# Patient Record
Sex: FEMALE | Race: WHITE | Employment: OTHER | URBAN - METROPOLITAN AREA
[De-identification: names, ages, dates, MRNs, and addresses within clinical notes are randomized per-mention and may not be internally consistent; named-entity substitution may affect disease eponyms.]

---

## 2019-09-16 ENCOUNTER — APPOINTMENT (OUTPATIENT)
Dept: PHYSICAL THERAPY | Age: 79
End: 2019-09-16

## 2019-09-20 ENCOUNTER — APPOINTMENT (OUTPATIENT)
Dept: PHYSICAL THERAPY | Age: 79
End: 2019-09-20

## 2019-11-22 ENCOUNTER — HOSPITAL ENCOUNTER (OUTPATIENT)
Dept: PHYSICAL THERAPY | Age: 79
Discharge: HOME | End: 2019-11-22
Payer: MEDICARE

## 2019-11-22 PROCEDURE — 97110 THERAPEUTIC EXERCISES: CPT

## 2019-11-22 PROCEDURE — 97162 PT EVAL MOD COMPLEX 30 MIN: CPT

## 2019-11-22 NOTE — PROGRESS NOTES
PHYSICAL THERAPY - DAILY TREATMENT NOTE    Patient Name: Bennie Caputo        Date: 2019  : 1940   YES Patient  Verified  Visit #:     Insurance: Payor: Sang Hanley / Plan: [place] MEDICARE PART A & B / Product Type: Medicare /      In time: 1010 Out time: 1100   Total Treatment Time: 50     BCBS/Medicare Time Tracking (below)   Total Timed Codes (min):  50 1:1 Treatment Time:  50     TREATMENT AREA =  Right hip pain [M25.551]    SUBJECTIVE  Pain Level (on 0 to 10 scale):  8  / 10   Medication Changes/New allergies or changes in medical history, any new surgeries or procedures? NO    If yes, update Summary List   Subjective Functional Status/Changes:  []  No changes reported   The patient reports onset of R hip pain that has progressively worsened over the last 6 months. Imaging reveals R hip OA and R lumbar facet arthropathy. She notes minimal LBP but reports intermittent tingling/burning down R leg to the outer lower leg, worse when lying in bed. R hip pain is constant and she takes Tylenol every 4 hours to manage pain. R hip pain worsens when walking and she sometimes must lift R leg with hands for car transfers.          Modalities Rationale:     decrease inflammation, decrease pain and increase tissue extensibility to improve patient's ability to perform ADLs   min [] Estim, type/location:                                      []  att     []  unatt     []  w/US     []  w/ice    []  w/heat    min []  Mechanical Traction: type/lbs                   []  pro   []  sup   []  int   []  cont    []  before manual    []  after manual    min []  Ultrasound, settings/location:      min []  Iontophoresis w/ dexamethasone, location:                                               []  take home patch       []  in clinic    min []  Ice     []  Heat    location/position:     min []  Vasopneumatic Device, press/temp:     min []  Other:    [x] Skin assessment post-treatment (if applicable):    [x] intact    [x]  redness- no adverse reaction     []redness - adverse reaction:        10 min Therapeutic Exercise:  [x]  See flow sheet   Rationale:      increase ROM and increase strength to improve the patients ability to perform unlimited ADLs       Billed With/As:   [] TE   [] TA   [] Neuro   [] Self Care Patient Education: [x] Review HEP    [] Progressed/Changed HEP based on:   [] positioning   [] body mechanics   [] transfers   [] heat/ice application    [x] other: Issued written HEP and reviewed     Other Objective/Functional Measures:    AROM: L/S flex 75%, ext 25%, B SB 25%  PROM: dec R hip IR/ER, hypomobile L/S PIVM  MMT: poor TA recruitment in H/L, R hip grossly 3+/5, hip abd 3-/5  MLT: dec B HS length       Post Treatment Pain Level (on 0 to 10) scale:   8  / 10     ASSESSMENT  Assessment/Changes in Function:     See POC     []  See Progress Note/Recertification   Patient will continue to benefit from skilled PT services to modify and progress therapeutic interventions, address functional mobility deficits, address ROM deficits, address strength deficits, analyze and address soft tissue restrictions, analyze and cue movement patterns, analyze and modify body mechanics/ergonomics, assess and modify postural abnormalities, address imbalance/dizziness and instruct in home and community integration to attain remaining goals. Progress toward goals / Updated goals:    Progressing towards newly established goals in [address] [facility] [address]  [x]  Upgrade activities as tolerated YES Continue plan of care   []  Discharge due to :    []  Other:      Therapist: Dawn Kumar, PT, DPT, MTC, CMTPT    Date: 11/22/2019 Time: 10:09 AM     No future appointments.

## 2019-11-22 NOTE — PROGRESS NOTES
[address]  PHYSICAL THERAPY AT [address] [place] [address]  [address] ,[address], [address]  Phone: [phone]  Fax: [phone] / [address] [address] PHYSICAL THERAPY SERVICES  Patient Name: Deepti Melara : 1940   Medical   Diagnosis: Right hip pain [M25.551] Treatment Diagnosis: R hip pain  LBP   Onset Date: 6 months ago     Referral Source: Karlene Allen MD Start of Care [facility]): 2019   Prior Hospitalization: See medical history Provider #: [id number]   Prior Level of Function: Able to perform car transfers without using hands to lift leg,    Comorbidities: Arthritis, osteoporosis    Medications: Verified on Patient Summary List   The Plan of Care and following information is based on the information from the initial evaluation.   ===========================================================================================  Assessment / key information: Patient is a 78 y.o. female who presents to [facility] with diagnosis of R hip and LBP. The patient reports onset of R hip pain that has progressively worsened over the last 6 months. Imaging reveals R hip OA and R lumbar facet arthropathy. She notes minimal LBP but reports intermittent tingling/burning down R leg to the outer lower leg, worse when lying in bed. R hip pain is constant and she takes Tylenol every 4 hours to manage pain. R hip pain worsens when walking and she sometimes must lift R leg with hands for car transfers. Objective PT examination findings include:  AROM: L/S flex 75%, ext 25%, B SB 25%  PROM: dec R hip IR/ER, hypomobile L/S PIVM  MMT: poor TA recruitment in H/L, R hip grossly 3+/5, hip abd 3-/5  MLT: dec B HS length    Special Test: + R Slump/SLR for LBP and neural tension    A home exercise program was demonstrated and provided to address the above objective and functional deficits.  Patient can benefit from skilled PT interventions to improve ROM, decrease pain, to facilitate performance of ADLs & improve overall functional status.   ===========================================================================================  Eval Complexity: History MEDIUM  Complexity : 1-2 comorbidities / personal factors will impact the outcome/ POC ;  Examination  MEDIUM Complexity : 3 Standardized tests and measures addressing body structure, function, activity limitation and / or participation in recreation ; Presentation MEDIUM Complexity : Evolving with changing characteristics ; Decision Making MEDIUM Complexity : FOTO score of 26-74; Overall Complexity MEDIUM  Problem List: pain affecting function, decrease ROM, decrease strength, decrease ADL/ functional abilitiies, decrease activity tolerance and decrease flexibility/ joint mobility, FOTO score 46  Treatment Plan may include any combination of the following: Therapeutic exercise, Therapeutic activities, Neuromuscular re-education, Physical agent/modality, Gait/balance training, Manual therapy including dry needling, Aquatic therapy and Patient education  Patient / Family readiness to learn indicated by: asking questions, trying to perform skills and interest  Persons(s) to be included in education: patient (P)  Barriers to Learning/Limitations: no  Measures taken:    Patient Goal (s): \"maybe strengthen muscle\"   Patient self reported health status: good  Rehabilitation Potential: good   Short Term Goals: To be accomplished in  1-2  weeks:  1) Patient to be independent and compliant with initial HEP to prevent further disability. 2) Patient to perform 2x10 bridges indicating increased hip ext/core strength needed for bed mobility. 3) Patient will report decreased c/o pain to < or = 2/10 to facilitate ease with bed mobility with manageable sx in R hip.  Long Term Goals:  To be accomplished in  3-4  weeks:  1) Patient to be independent & compliant with a progressive, high level HEP in order to maintain gains made in physical therapy. 2) Improve FOTO score to 57 indicating significant functional improvement in order to return to PLOF and transfers. 3) Restore R hip strength in order to perform car transfers without assistance from UE. Frequency / Duration:   Patient to be seen  2-3  times per week for 3-4  weeks:  Patient / Caregiver education and instruction: self care, activity modification and exercises    Therapist Signature: Roderick Bowman PT, DPT, MTC, CMTPT Date: 71/75/0811   Certification Period: 11/22/19 to 2/20/20 Time: 2:52 PM   ===========================================================================================  I certify that the above Physical Therapy Services are being furnished while the patient is under my care. I agree with the treatment plan and certify that this therapy is necessary. Physician Signature:        Date:       Time:     Please sign and return to [facility] or you may fax the signed copy to [phone]. Thank you.

## 2019-11-25 ENCOUNTER — HOSPITAL ENCOUNTER (OUTPATIENT)
Dept: PHYSICAL THERAPY | Age: 79
Discharge: HOME | End: 2019-11-25
Payer: MEDICARE

## 2019-11-25 PROCEDURE — 97110 THERAPEUTIC EXERCISES: CPT

## 2019-11-26 NOTE — PROGRESS NOTES
PHYSICAL THERAPY - DAILY TREATMENT NOTE    Patient Name: Ximena Bell        Date: 2019  : 1940   YES Patient  Verified  Visit #:     Insurance: Payor: Jon Arnold / Plan: [place] MEDICARE PART A & B / Product Type: Medicare /      In time: 235 Out time: 335   Total Treatment Time: 55     BCBS/Medicare Time Tracking (below)   Total Timed Codes (min):  54 1:1 Treatment Time:  40     TREATMENT AREA =  Right hip pain [M25.551]    SUBJECTIVE  Pain Level (on 0 to 10 scale):  8  / 10   Medication Changes/New allergies or changes in medical history, any new surgeries or procedures?     NO    If yes, update Summary List   Subjective Functional Status/Changes:  []  No changes reported   The stretches help to loosen my back up        Modalities Rationale:     decrease inflammation, decrease pain and increase tissue extensibility to improve patient's ability to perform ADLs   min [] Estim, type/location:                                      []  att     []  unatt     []  w/US     []  w/ice    []  w/heat    min []  Mechanical Traction: type/lbs                   []  pro   []  sup   []  int   []  cont    []  before manual    []  after manual    min []  Ultrasound, settings/location:      min []  Iontophoresis w/ dexamethasone, location:                                               []  take home patch       []  in clinic    min []  Ice     []  Heat    location/position:     min []  Vasopneumatic Device, press/temp:     min []  Other:    [x] Skin assessment post-treatment (if applicable):    [x]  intact    [x]  redness- no adverse reaction     []redness - adverse reaction:        55/40 min Therapeutic Exercise:  [x]  See flow sheet   Rationale:      increase ROM and increase strength to improve the patients ability to perform unlimited ADLs       Billed With/As:   [] TE   [] TA   [] Neuro   [] Self Care Patient Education: [x] Review HEP    [] Progressed/Changed HEP based on:   [] positioning   [] body mechanics   [] transfers   [] heat/ice application    [x] other: Issued written HEP and reviewed     Other Objective/Functional Measures:    TE per FS   Post Treatment Pain Level (on 0 to 10) scale:   7  / 10     ASSESSMENT  Assessment/Changes in Function:     Poor TA recruitment. Difficulty isolating and unable to perform without valsalva     []  See Progress Note/Recertification   Patient will continue to benefit from skilled PT services to modify and progress therapeutic interventions, address functional mobility deficits, address ROM deficits, address strength deficits, analyze and address soft tissue restrictions, analyze and cue movement patterns, analyze and modify body mechanics/ergonomics, assess and modify postural abnormalities, address imbalance/dizziness and instruct in home and community integration to attain remaining goals.    Progress toward goals / Updated goals:    Progressing towards STG1     PLAN  [x]  Upgrade activities as tolerated YES Continue plan of care   []  Discharge due to :    []  Other:      Therapist: Georgie Choudhury, PT, DPT, MTC, CMTPT    Date: 11/25/2019 Time: 10:09 AM     Future Appointments   Date Time Provider Rukhsana Gutiérrez   12/9/2019 10:30 AM [facility]   12/11/2019 10:00 AM [facility]   12/16/2019 11:30 AM [facility]   12/18/2019 11:00 AM [facility]   12/23/2019  3:00 PM [facility]   12/27/2019 11:30 AM [facility]

## 2019-12-09 ENCOUNTER — HOSPITAL ENCOUNTER (OUTPATIENT)
Dept: PHYSICAL THERAPY | Age: 79
Discharge: HOME | End: 2019-12-09
Payer: MEDICARE

## 2019-12-09 PROCEDURE — 97110 THERAPEUTIC EXERCISES: CPT

## 2019-12-09 NOTE — PROGRESS NOTES
PHYSICAL THERAPY - DAILY TREATMENT NOTE    Patient Name: Mitzi Sloan        Date: 2019  : 1940   YES Patient  Verified  Visit #:   3   of   12  Insurance: Payor: Sharee Barton / Plan: [place] MEDICARE PART A & B / Product Type: Medicare /      In time: 1050 Out time: 105   Total Treatment Time: 50     BCBS/Medicare Time Tracking (below)   Total Timed Codes (min):  50 1:1 Treatment Time:  30     TREATMENT AREA =  Right hip pain [M25.551]    SUBJECTIVE  Pain Level (on 0 to 10 scale):  3.5-10  / 10   Medication Changes/New allergies or changes in medical history, any new surgeries or procedures?     NO    If yes, update Summary List   Subjective Functional Status/Changes:  []  No changes reported     Zuly been very busy and havent been able to do as many of the exercises but they help when I do them      Modalities Rationale:     decrease inflammation, decrease pain and increase tissue extensibility to improve patient's ability to perform ADLs   min [] Estim, type/location:                                      []  att     []  unatt     []  w/US     []  w/ice    []  w/heat    min []  Mechanical Traction: type/lbs                   []  pro   []  sup   []  int   []  cont    []  before manual    []  after manual    min []  Ultrasound, settings/location:      min []  Iontophoresis w/ dexamethasone, location:                                               []  take home patch       []  in clinic    min []  Ice     []  Heat    location/position:     min []  Vasopneumatic Device, press/temp:     min []  Other:    [x] Skin assessment post-treatment (if applicable):    [x]  intact    [x]  redness- no adverse reaction     []redness  adverse reaction:        50/30 min Therapeutic Exercise:  [x]  See flow sheet   Rationale:      increase ROM and increase strength to improve the patients ability to perform unlimited ADLs       Billed With/As:   [] TE   [] TA   [] Neuro   [] Self Care Patient Education: [x] Review HEP    [] Progressed/Changed HEP based on:   [] positioning   [] body mechanics   [] transfers   [] heat/ice application    [x] other:      Other Objective/Functional Measures:    TE per FS     Post Treatment Pain Level (on 0 to 10) scale:   5  / 10     ASSESSMENT  Assessment/Changes in Function:     Improved TA recruitment in H/L. Dec cueing required to cont breathing during all core stabilization exercises     []  See Progress Note/Recertification   Patient will continue to benefit from skilled PT services to modify and progress therapeutic interventions, address functional mobility deficits, address ROM deficits, address strength deficits, analyze and address soft tissue restrictions, analyze and cue movement patterns, analyze and modify body mechanics/ergonomics, assess and modify postural abnormalities, address imbalance/dizziness and instruct in home and community integration to attain remaining goals.    Progress toward goals / Updated goals:    Progressing towards STG1, 50% compliance     PLAN  [x]  Upgrade activities as tolerated YES Continue plan of care   []  Discharge due to :    []  Other:      Therapist: Abraham Choi, PT, DPT, MTC, CMTPT    Date: 12/9/2019 Time: 10:09 AM     Future Appointments   Date Time Provider Rukhsana Gutiérrez   12/11/2019 10:00 AM [place] [facility]   12/16/2019 11:30 AM [place] [facility]   12/18/2019 11:00 AM [place] [facility]   12/23/2019  3:00 PM [place] [facility]   12/27/2019 11:30 AM [place] [facility]

## 2019-12-11 ENCOUNTER — HOSPITAL ENCOUNTER (OUTPATIENT)
Dept: PHYSICAL THERAPY | Age: 79
Discharge: HOME | End: 2019-12-11
Payer: MEDICARE

## 2019-12-11 PROCEDURE — 97110 THERAPEUTIC EXERCISES: CPT

## 2019-12-16 ENCOUNTER — HOSPITAL ENCOUNTER (OUTPATIENT)
Dept: PHYSICAL THERAPY | Age: 79
Discharge: HOME | End: 2019-12-16
Payer: MEDICARE

## 2019-12-16 PROCEDURE — 97110 THERAPEUTIC EXERCISES: CPT

## 2019-12-16 NOTE — PROGRESS NOTES
PHYSICAL THERAPY - DAILY TREATMENT NOTE    Patient Name: Abdiaziz Monreal        Date: 2019  : 1940   YES Patient  Verified  Visit #:     Insurance: Payor: Wilfrido Nelson / Plan: [place] MEDICARE PART A & B / Product Type: Medicare /      In time: 8440 Out time: 308   Total Treatment Time: 50     BCBS/Medicare Time Tracking (below)   Total Timed Codes (min):  50 1:1 Treatment Time:  30     TREATMENT AREA =  Right hip pain [M25.551]    SUBJECTIVE  Pain Level (on 0 to 10 scale):  9  / 10   Medication Changes/New allergies or changes in medical history, any new surgeries or procedures? NO    If yes, update Summary List   Subjective Functional Status/Changes:  []  No changes reported     I reached down with my R arm to  a pen I dropped last week and felt immediate pain down my R arm and in my back. Its still sore.       Modalities Rationale:     decrease inflammation, decrease pain and increase tissue extensibility to improve patient's ability to perform ADLs   min [] Estim, type/location:                                      []  att     []  unatt     []  w/US     []  w/ice    []  w/heat    min []  Mechanical Traction: type/lbs                   []  pro   []  sup   []  int   []  cont    []  before manual    []  after manual    min []  Ultrasound, settings/location:      min []  Iontophoresis w/ dexamethasone, location:                                               []  take home patch       []  in clinic    min []  Ice     []  Heat    location/position:     min []  Vasopneumatic Device, press/temp:     min []  Other:    [x] Skin assessment post-treatment (if applicable):    [x]  intact    [x]  redness- no adverse reaction     []redness  adverse reaction:        50/30 min Therapeutic Exercise:  [x]  See flow sheet   Rationale:      increase ROM and increase strength to improve the patients ability to perform unlimited ADLs       Billed With/As:   [] TE   [] TA   [] Neuro   [] Self Care Patient Education: [x] Review HEP    [] Progressed/Changed HEP based on:   [] positioning   [] body mechanics   [] transfers   [] heat/ice application    [] other:      Other Objective/Functional Measures:    TE per FS  Held PPT>bridge, c/o pain during S/L clam   Post Treatment Pain Level (on 0 to 10) scale:   5-6  / 10     ASSESSMENT  Assessment/Changes in Function:     Fair tolerance to TE involving glutes but able to tolerate all others without inc in pain      []  See Progress Note/Recertification   Patient will continue to benefit from skilled PT services to modify and progress therapeutic interventions, address functional mobility deficits, address ROM deficits, address strength deficits, analyze and address soft tissue restrictions, analyze and cue movement patterns, analyze and modify body mechanics/ergonomics, assess and modify postural abnormalities, address imbalance/dizziness and instruct in home and community integration to attain remaining goals.    Progress toward goals / Updated goals:    Min progress towards STG2 today due to inc in pain     PLAN  [x]  Upgrade activities as tolerated YES Continue plan of care   []  Discharge due to :    []  Other:      Therapist: Kamron Gipson PT, DPT, MTC, CMTPT    Date: 12/16/2019 Time: 10:09 AM     Future Appointments   Date Time Provider Rukhsana Gutiérrez   12/18/2019 11:00 AM Althea Roque PT [place]   12/27/2019 11:30 AM Althea Roque PT [place]

## 2019-12-18 ENCOUNTER — APPOINTMENT (OUTPATIENT)
Dept: PHYSICAL THERAPY | Age: 79
End: 2019-12-18
Payer: MEDICARE

## 2019-12-23 ENCOUNTER — APPOINTMENT (OUTPATIENT)
Dept: PHYSICAL THERAPY | Age: 79
End: 2019-12-23
Payer: MEDICARE

## 2019-12-27 ENCOUNTER — APPOINTMENT (OUTPATIENT)
Dept: PHYSICAL THERAPY | Age: 79
End: 2019-12-27
Payer: MEDICARE

## 2020-12-01 NOTE — PROGRESS NOTES
[address] [facility] PHYSICAL THERAPY AT [address] [place] [address]  [address], [address]  Phone: [phone]  Fax: [phone] FOR PHYSICAL THERAPY          Patient Name: Yesenia York : 1940   Treatment/Medical Diagnosis: Right hip pain [M25.551]   Onset Date: 6 mo prior to eval    Referral Source: Kristi Olivas MD [facility]): 19   Prior Hospitalization: See Medical History Provider #: [id number]   Prior Level of Function: Able to perform car transfers without using hands to lift leg   Comorbidities: Arthritis, osteoporosis    Medications: Verified on Patient Summary List   Visits from [facility]: 5 Missed Visits: 1     Key Functional Changes/Progress: Patient is a 78 y.o. female who presents to [facility] with diagnosis of R hip and LBP. The patient did not return to PT after 19, canceling all future appointments due to extended travel and being [address] [place] [address]. Therefore a formal reassessment was unable to be performed and current status is unknown. At her last attended visit she reported 9/10 pain and had fair tolerance to therex due to c/o inc LBP after bending over to  dropped object from floor. Assessments/Recommendations: Discontinue therapy due to lack of attendance or compliance. If you have any questions/comments please contact us directly at [phone]. Thank you for allowing us to assist in the care of your patient.     Therapist Signature: Horacio Booker, PT, DPT, MTC, CMTPT Date: 2020   Reporting Period: 19 to 19 Time: 3:13 PM

## 2022-08-09 ENCOUNTER — HOSPITAL ENCOUNTER (OUTPATIENT)
Dept: PHYSICAL THERAPY | Age: 82
Discharge: HOME | End: 2022-08-09
Payer: MEDICARE

## 2022-08-09 PROCEDURE — 97535 SELF CARE MNGMENT TRAINING: CPT

## 2022-08-09 PROCEDURE — 97112 NEUROMUSCULAR REEDUCATION: CPT

## 2022-08-09 PROCEDURE — 97162 PT EVAL MOD COMPLEX 30 MIN: CPT

## 2022-08-09 NOTE — THERAPY EVALUATION
PHYSICAL THERAPY - DAILY TREATMENT NOTE    Patient Name: Nova Adrian        Date: 2022  : 1940   YES Patient  Verified  Visit #:     Insurance: Payor: Sheela Falconors / Plan: [place] MEDICARE PART A & B / Product Type: Medicare /      In time: 130 Out time: 225   Total Treatment Time: 50     BCBS/Medicare Time Tracking (below)   Total Timed Codes (min):  25 1:1 Treatment Time:  25     TREATMENT AREA =  Neck pain [M54.2]  Right shoulder pain [M25.511]    SUBJECTIVE  Pain Level (on 0 to 10 scale):  8  / 10   Medication Changes/New allergies or changes in medical history, any new surgeries or procedures? NO    If yes, update Summary List   Subjective Functional Status/Changes:  []  No changes reported   The patient reports onset of L hand numbness/tingling beginning approx 3 months ago and cervical pain. She notes L UE radicular SX inc when sitting in slouched position and unsupported chair. She is able to abolish SX if correcting posture or moving head into R SB position. She is LHD.        Modalities Rationale:     decrease inflammation, decrease pain and increase tissue extensibility to improve patient's ability to perform ADLs   min [] Estim, type/location:                                     []  att     []  unatt     []  w/US     []  w/ice    []  w/heat    min []  Mechanical Traction: type/lbs                   []  pro   []  sup   []  int   []  cont    []  before manual    []  after manual    min []  Ultrasound, settings/location:      min []  Iontophoresis w/ dexamethasone, location:                                               []  take home patch       []  in clinic    min []  Ice     []  Heat    location/position:     min []  Vasopneumatic Device, press/temp: If using vaso (only need to measure limb vaso being performed on)      pre-treatment girth :       post-treatment girth :       measured at (landmark location) :      min []  Other:    [x] Skin assessment post-treatment (if applicable):    [x]  intact    [x]  redness- no adverse reaction     []redness - adverse reaction:         min Therapeutic Exercise:  [x]  See flow sheet   Rationale:      increase ROM and increase strength to improve the patients ability to perform unlimited ADLs      min Manual Therapy: Technique:      [] S/DTM []IASTM []PROM  [] Passive Stretching  []manual TPR  []Jt manipulation:Gr I [] II []  III [] IV[] V[]  Treatment/Area:     Rationale:      decrease pain, increase ROM, increase tissue extensibility and decrease trigger points to improve patient's ability to perform ADLs  The manual therapy interventions were performed at a separate and distinct time from the therapeutic activities interventions.      min Therapeutic Activity: [x]  See flow sheet   Rationale:    increase ROM, increase strength, and improve coordination to improve the patients mechanics with reaching, transfers, reading    10 min Neuromuscular Re-ed: [x]  See flow sheet   Rationale:    improve coordination, improve balance, and increase proprioception to improve the patients postural awareness, stability and motor control    15 min Self Care: Postural review including use of lumbar roll and importance of supported sitting on couch/recliner/dining chair with neutral lumbar spine which improves cervicothoracic alignment, sitting ergonomic modifications at computer, sleeping position and use of cervical roll   Rationale:    increase ROM, increase strength and postural mechanics to improve the patients ability to perform home care     Billed With/As:   [] TE   [] TA   [x] Neuro   [x] Self Care Patient Education: [x] Review HEP    [] Progressed/Changed HEP based on:   [] positioning   [] body mechanics   [] transfers   [] heat/ice application    [x] other: Issued written HEP and reviewed     Other Objective/Functional Measures:    Postural Assessment: FHRS posture, holds head in slight R SB position, fair postural awareness, lumbar flexion collapse in unsupported sitting  AROM: C/S flex 50%, ext 25% with ERP, SBL 25% with inc L hand radicular SX, SBR 40% dec L radicular SX, dec scap retraction ROM and performed with abn mm firing and movement pattern   PROM: hypomobile T/S ext and L>R rotation, hypomobile OA fwd nod  MMT: dec strength scap retractors L>R and deep cervical neck flexors  MLT: dec length L>R pectoral girdle    Palpation: TP throughout L>R UT, SCM, lev scap, rhomboids  Special Tests: + L ULTT     Post Treatment Pain Level (on 0 to 10) scale:   7  / 10     ASSESSMENT  Assessment/Changes in Function:     See POC     []  See Progress Note/Recertification   Patient will continue to benefit from skilled PT services to modify and progress therapeutic interventions, address functional mobility deficits, address ROM deficits, address strength deficits, analyze and address soft tissue restrictions, analyze and cue movement patterns, analyze and modify body mechanics/ergonomics, assess and modify postural abnormalities, address imbalance/dizziness and instruct in home and community integration to attain remaining goals. Progress toward goals / Updated goals:    Progressing towards newly established goals in [address] [facility] [address]  [x]  Upgrade activities as tolerated YES Continue plan of care   []  Discharge due to :    []  Other:      Therapist: Neel Enriquez PT, DPT, MTC, CMTPT    Date: 8/9/2022 Time: 1:39 PM     No future appointments.

## 2022-08-09 NOTE — THERAPY EVALUATION
[address] [place] [address] PHYSICAL THERAPY AT [address] [place] [address] Eva Plass [address], [address] [place] [phone], [address]  Phone: [phone]  Fax: [phone] / [address] [address] PHYSICAL THERAPY SERVICES  Patient Name: [place] : 1940   Medical   Diagnosis: Neck Pain [M54.2] Treatment Diagnosis: C/S pain with UE radiculopathy   Onset Date: May 2022     Referral Source: Sohan Mccoy MD Start of Care [facility]): 2022   Prior Hospitalization: See medical history Provider #: [id number]   Prior Level of Function: Unlimited sitting tolerance, able to perform ADLs without UE radiculopathy   Comorbidities: Arthritis, HX lumbar stenosis   Medications: Verified on Patient Summary List   The Plan of Care and following information is based on the information from the initial evaluation.   ===========================================================================================  Assessment / key information: Patient is a 78 y.o. female who presents to [facility] with diagnosis of C/S pain. The patient reports onset of L hand numbness/tingling and cervical pain beginning approx 3 months ago. She notes L UE radicular SX inc when sitting in slouched position and unsupportive chairs. She is able to abolish SX if correcting posture or moving head into R SB position. She is LHD.     Objective PT examination findings include:  Postural Assessment: moderate FHRS posture, holds head in slight R SB position, fair postural awareness, lumbar flexion collapse in unsupported sitting  AROM: C/S flex 50%, ext 25% with ERP, SBL/RotL 25% with inc L hand radicular SX, SBR 40% dec L radicular SX, RotR 40% with inc L UE SX, dec scap retraction ROM and performed with abn mm firing and movement pattern   PROM: hypomobile T/S ext and L>R rotation, hypomobile OA fwd nod  MMT: dec strength scap retractors L>R and deep cervical neck flexors  MLT: dec length L>R pectoral girdle    Palpation: TP throughout L>R UT, SCM, lev scap, rhomboids  Special Tests: + L ULTT    A home exercise program was demonstrated and provided to address the above objective and functional deficits. Patient can benefit from skilled PT interventions to improve ROM, decrease radicular SX, to facilitate performance of ADLs & improve overall functional status.   ===========================================================================================  Eval Complexity: History MEDIUM  Complexity : 1-2 comorbidities / personal factors will impact the outcome/ POC ;  Examination  MEDIUM Complexity : 3 Standardized tests and measures addressing body structure, function, activity limitation and / or participation in recreation ; Presentation MEDIUM Complexity : Evolving with changing characteristics ; Decision Making MEDIUM Complexity : FOTO score of 26-74; Overall Complexity MEDIUM  Problem List: pain affecting function, decrease ROM, decrease strength, decrease ADL/ functional abilitiies, decrease activity tolerance, and decrease flexibility/ joint mobility, FOTO score 54  Treatment Plan may include any combination of the following: Therapeutic exercise, Therapeutic activities, Neuromuscular re-education, Physical agent/modality, Gait/balance training, Manual therapy including dry needling, Aquatic therapy and Patient education  Patient / Family readiness to learn indicated by: asking questions, trying to perform skills and interest  Persons(s) to be included in education: patient (P)  Barriers to Learning/Limitations: no  Measures taken:    Patient Goal (s): \"Something to keep my left hand from going to sleep. Neck causes this. \"   Patient self reported health status: good  Rehabilitation Potential: good  Short Term Goals: To be accomplished in  1-2  weeks:  1) Patient to be independent and compliant with initial HEP to prevent further disability.     2) Restore C/S AROM Rot to 50% without onset of radicular SX so ROM is available for driving. 3) Patient will report decreased c/o pain to < or = 3/10 to facilitate ability to sit for 15 min with manageable SX in L UE using postural correction techniques. Long Term Goals: To be accomplished in  3-4  weeks:  1) Patient to be independent & compliant with a progressive, high level HEP in order to maintain gains made in physical therapy. 2) Improve FOTO score to 61 indicating significant functional improvement in order to return to OF. 3) Patient to report 50% dec in L hand radicular SX to allow for inc ease performing ADLs and household chores. 4) Restore C/S AROM to [facility] without reproduction of L UE radicular SX to allow for inc ease with L UE OH reaching and prolonged sitting. Frequency / Duration:   Patient to be seen  2-3  times per week for 3-4  weeks:  Patient / Caregiver education and instruction: self care, activity modification and exercises    Therapist Signature: Antwan Shaffer, PT, DPT, MTC, CMTPT Date: 4/9/1170   Certification Period: 8/9/22 to 11/7/22 Time: 3:30 PM   ===========================================================================================  I certify that the above Physical Therapy Services are being furnished while the patient is under my care. I agree with the treatment plan and certify that this therapy is necessary. Physician Signature:        Date:       Time:              Valeria Hidalgo MD  Please sign and return to [facility] or you may fax the signed copy to [phone]. Thank you.

## 2022-08-15 ENCOUNTER — HOSPITAL ENCOUNTER (OUTPATIENT)
Dept: PHYSICAL THERAPY | Age: 82
Discharge: HOME | End: 2022-08-15
Payer: MEDICARE

## 2022-08-15 PROCEDURE — 97110 THERAPEUTIC EXERCISES: CPT

## 2022-08-15 PROCEDURE — 97112 NEUROMUSCULAR REEDUCATION: CPT

## 2022-08-15 PROCEDURE — 97530 THERAPEUTIC ACTIVITIES: CPT

## 2022-08-15 NOTE — THERAPY EVALUATION
PHYSICAL THERAPY - DAILY TREATMENT NOTE    Patient Name: Irene Bardales        Date: 8/15/2022  : 1940   YES Patient  Verified  Visit #:     Insurance: Payor: Gavin Lira / Plan: [place] MEDICARE PART A & B / Product Type: Medicare /      In time:  Out time: 1200   Total Treatment Time: 45     BCBS/Medicare Time Tracking (below)   Total Timed Codes (min):  45 1:1 Treatment Time:  45     TREATMENT AREA =  Neck pain [M54.2]    SUBJECTIVE  Pain Level (on 0 to 10 scale):  8  / 10   Medication Changes/New allergies or changes in medical history, any new surgeries or procedures? NO    If yes, update Summary List   Subjective Functional Status/Changes:  []  No changes reported     The hand pain and numbness is getting worse and takes longer for the pain to go away when I change my position. I have to hold my head in the perfect position.  Its worse when I look down while writing (pt is LHD)       Modalities Rationale:     decrease inflammation, decrease pain and increase tissue extensibility to improve patient's ability to perform ADLs   min [] Estim, type/location:                                     []  att     []  unatt     []  w/US     []  w/ice    []  w/heat    min []  Mechanical Traction: type/lbs                   []  pro   []  sup   []  int   []  cont    []  before manual    []  after manual    min []  Ultrasound, settings/location:      min []  Iontophoresis w/ dexamethasone, location:                                               []  take home patch       []  in clinic    min []  Ice     []  Heat    location/position:     min []  Vasopneumatic Device, press/temp: If using vaso (only need to measure limb vaso being performed on)      pre-treatment girth :       post-treatment girth :       measured at (landmark location) :      min []  Other:    [x] Skin assessment post-treatment (if applicable):    [x]  intact    [x]  redness- no adverse reaction     []redness - adverse reaction: 15 min Therapeutic Exercise:  [x]  See flow sheet   Rationale:      increase ROM and increase strength to improve the patients ability to perform unlimited ADLs      min Therapeutic Activity: [x]  See flow sheet   Rationale:    increase ROM, increase strength, and improve coordination to improve the patients mechanics with reaching, transfers    15 min Neuromuscular Re-ed: [x]  See flow sheet, postural review, spinal proprioception when sitting, writing, reaching   Rationale:    improve coordination, improve balance, and increase proprioception to improve the patients postural awareness, stability and motor control    15 min Self Care: Use of postural brace, take motrin/OTC NSAIDS as recommended on bottle and per MD direction, recommendations to improve posture when writing checks and standing to wash dishes   Rationale:    increase ROM, increase strength and postural mechanics to improve the patients ability to perform home care     Billed With/As:   [] TE   [] TA   [x] Neuro   [x] Self Care Patient Education: [x] Review HEP    [] Progressed/Changed HEP based on:   [] positioning   [] body mechanics   [] transfers   [] heat/ice application    [x] other:      Other Objective/Functional Measures:    See FS     Post Treatment Pain Level (on 0 to 10) scale:   7  / 10     ASSESSMENT  Assessment/Changes in Function:     Improved initial sitting posture, assume FHRS position when beginning to fatigue, conversing, reaching all resulting in immediate onset of L hand radicular SX, able to abolish when correcting posture and maintaining neutral c/s     []  See Progress Note/Recertification   Patient will continue to benefit from skilled PT services to modify and progress therapeutic interventions, address functional mobility deficits, address ROM deficits, address strength deficits, analyze and address soft tissue restrictions, analyze and cue movement patterns, analyze and modify body mechanics/ergonomics, assess and modify postural abnormalities, address imbalance/dizziness and instruct in home and community integration to attain remaining goals.    Progress toward goals / Updated goals:    Progressing towards STG1     PLAN  [x]  Upgrade activities as tolerated YES Continue plan of care   []  Discharge due to :    []  Other:      Therapist: Julien Retana PT, DPT, MTC, CMTPT    Date: 8/15/2022 Time: 1:39 PM     Future Appointments   Date Time Provider Rukhsana Gutiérrez   8/17/2022 12:00 PM Amor Lares PT [facility]   8/26/2022 11:00 AM Latha Bangura, PT [facility]   8/29/2022 11:15 AM Amor Lares PT [facility]   8/31/2022 12:00 PM Amor Lares PT [facility]   9/6/2022 11:00 AM Latha Bangura PT [facility]   9/8/2022 11:00 AM FABIAN Marquez [facility]

## 2022-08-17 ENCOUNTER — HOSPITAL ENCOUNTER (OUTPATIENT)
Dept: PHYSICAL THERAPY | Age: 82
Discharge: HOME | End: 2022-08-17
Payer: MEDICARE

## 2022-08-17 PROCEDURE — 97112 NEUROMUSCULAR REEDUCATION: CPT

## 2022-08-17 PROCEDURE — 97110 THERAPEUTIC EXERCISES: CPT

## 2022-08-17 PROCEDURE — 97535 SELF CARE MNGMENT TRAINING: CPT

## 2022-08-17 NOTE — THERAPY EVALUATION
PHYSICAL THERAPY - DAILY TREATMENT NOTE    Patient Name: Rom Langford        Date: 2022  : 1940   YES Patient  Verified  Visit #:   3   of   12  Insurance: Payor: Inna Haines / Plan: [place] MEDICARE PART A & B / Product Type: Medicare /      In time: 9773 Out time: 1250   Total Treatment Time: 45     BCBS/Medicare Time Tracking (below)   Total Timed Codes (min):  45 1:1 Treatment Time:  40     TREATMENT AREA =  Neck pain [M54.2]    SUBJECTIVE  Pain Level (on 0 to 10 scale):  8  / 10   Medication Changes/New allergies or changes in medical history, any new surgeries or procedures?     NO    If yes, update Summary List   Subjective Functional Status/Changes:  []  No changes reported     Sitting up straighter and not rounding my shoulders to look down when writing checks is helping       Modalities Rationale:     decrease inflammation, decrease pain and increase tissue extensibility to improve patient's ability to perform ADLs   min [] Estim, type/location:                                     []  att     []  unatt     []  w/US     []  w/ice    []  w/heat    min []  Mechanical Traction: type/lbs                   []  pro   []  sup   []  int   []  cont    []  before manual    []  after manual    min []  Ultrasound, settings/location:      min []  Iontophoresis w/ dexamethasone, location:                                               []  take home patch       []  in clinic    min []  Ice     []  Heat    location/position:     min []  Vasopneumatic Device, press/temp: If using vaso (only need to measure limb vaso being performed on)      pre-treatment girth :       post-treatment girth :       measured at (landmark location) :      min []  Other:    [x] Skin assessment post-treatment (if applicable):    [x]  intact    [x]  redness- no adverse reaction     []redness - adverse reaction:        15/10 min Therapeutic Exercise:  [x]  See flow sheet   Rationale:      increase ROM and increase strength to improve the patients ability to perform unlimited ADLs      min Therapeutic Activity: [x]  See flow sheet   Rationale:    increase ROM, increase strength, and improve coordination to improve the patients mechanics with reaching, transfers    15 min Neuromuscular Re-ed: [x]  See flow sheet, postural review, spinal proprioception when sitting, writing, reaching   Rationale:    improve coordination, improve balance, and increase proprioception to improve the patients postural awareness, stability and motor control    15 min Self Care: Don/doff postural brace with lumbar strap   Rationale:    increase ROM, increase strength and postural mechanics to improve the patients ability to perform home care     Billed With/As:   [] TE   [] TA   [x] Neuro   [x] Self Care Patient Education: [x] Review HEP    [] Progressed/Changed HEP based on:   [] positioning   [] body mechanics   [] transfers   [] heat/ice application    [x] other:      Other Objective/Functional Measures:    See FS, added seated cervical and scapular retraction     Post Treatment Pain Level (on 0 to 10) scale:   7  / 10     ASSESSMENT  Assessment/Changes in Function:     Able to tolerate sitting for inc length of time wearing new brace without onset of L UE radicular SX. Improving postural awareness and able to correct thoracic flexion/rounded shoulder collapse with fewer cues. []  See Progress Note/Recertification   Patient will continue to benefit from skilled PT services to modify and progress therapeutic interventions, address functional mobility deficits, address ROM deficits, address strength deficits, analyze and address soft tissue restrictions, analyze and cue movement patterns, analyze and modify body mechanics/ergonomics, assess and modify postural abnormalities, address imbalance/dizziness and instruct in home and community integration to attain remaining goals.    Progress toward goals / Updated goals:    Met STG1, progressing towards STG2 and 3     PLAN  [x]  Upgrade activities as tolerated YES Continue plan of care   []  Discharge due to :    []  Other:      Therapist: Miri Funez, PT, DPT, MTC, CMTPT    Date: 8/17/2022 Time: 1:39 PM     Future Appointments   Date Time Provider Rukhsana Gutiérrez   8/26/2022 11:00 AM Thang Mills PT [facility]   8/29/2022 11:15 AM Ruby Chavez PT [facility]   8/31/2022 12:00 PM Ruby Chavez PT [facility]   9/6/2022 11:00 AM Thang Mills PT [facility]   9/8/2022 11:00 AM Thang Mills PT [facility]

## 2022-08-26 ENCOUNTER — HOSPITAL ENCOUNTER (OUTPATIENT)
Dept: PHYSICAL THERAPY | Age: 82
Discharge: HOME | End: 2022-08-26
Payer: MEDICARE

## 2022-08-26 PROCEDURE — 97140 MANUAL THERAPY 1/> REGIONS: CPT

## 2022-08-26 PROCEDURE — 97112 NEUROMUSCULAR REEDUCATION: CPT

## 2022-08-26 PROCEDURE — 97110 THERAPEUTIC EXERCISES: CPT

## 2022-08-26 NOTE — PROGRESS NOTES
PHYSICAL THERAPY - DAILY TREATMENT NOTE    Patient Name: Temi         Date: 2022  : 1940   YES Patient  Verified  Visit #:      of   12  Insurance: Payor: Nisreen Garza / Plan: [place] MED[place] & B / Product Type: Medicare /      In time: 1100 Out time:    Total Treatment Time: 45     BCBS/Medicare Time Tracking (below)   Total Timed Codes (min):  45 1:1 Treatment Time:  45     TREATMENT AREA =  Neck pain [M54.2]    SUBJECTIVE  Pain Level (on 0 to 10 scale):  8  / 10   Medication Changes/New allergies or changes in medical history, any new surgeries or procedures? NO    If yes, update Summary List   Subjective Functional Status/Changes:  []  No changes reported     My tingles are stronger when I slouch forward. The brace is helping with my posture but I feel a lot of stiffness and pressure in my shoulders. Modalities Rationale:     decrease edema, decrease inflammation, and decrease pain to improve patient's ability to perform pain-free ADLs.     min [] Estim, type/location:                                      []  att     []  unatt     []  w/US     []  w/ice    []  w/heat    min []  Mechanical Traction: type/lbs                   []  pro   []  sup   []  int   []  cont    []  before manual    []  after manual    min []  Ultrasound, settings/location:      min []  Iontophoresis w/ dexamethasone, location:                                               []  take home patch       []  in clinic    min []  Ice     []  Heat    location/position:     min []  Vasopneumatic Device, press/temp:    If using vaso (only need to measure limb vaso being performed on)      pre-treatment girth :       post-treatment girth :       measured at (landmark location) :      min []  Other:    [] Skin assessment post-treatment (if applicable):    []  intact    []  redness- no adverse reaction                  []redness - adverse reaction:        15 min Therapeutic Exercise:  [x]  See flow sheet Rationale:      increase ROM, increase strength, and improve coordination to improve the patients ability to perform unlimited ADLs. 10 min Manual Therapy: Gentle STM to B UT and mid scapular region in sitting   Rationale:      decrease pain, increase ROM, increase tissue extensibility, and decrease edema  to improve patient's ability to improve postural control  The manual therapy interventions were performed at a separate and distinct time from the therapeutic activities interventions. 15 min Neuromuscular Re-ed: [x]  See flow sheet   Rationale:    increase ROM, increase strength, improve coordination, and increase proprioception to improve the patients ability to improve postural control    Billed With/As:   [] TE   [] TA   [] Neuro   [] Self Care Patient Education: [x] Review HEP    [] Progressed/Changed HEP based on:   [] positioning   [] body mechanics   [] transfers   [] heat/ice application    [] other:      Other Objective/Functional Measures: Added tband B ER and Tband Rows     Post Treatment Pain Level (on 0 to 10) scale:   6  / 10     ASSESSMENT  Assessment/Changes in Function:     PT reported improvement in stiffness in neck following session. She required frequent cues to correct for postural collapse in seated position. Good scapular retraction noted with manual cues although pt was unable to maintain when cues were withdrawn.      []  See Progress Note/Recertification   Patient will continue to benefit from skilled PT services to modify and progress therapeutic interventions, address functional mobility deficits, address ROM deficits, address strength deficits, analyze and address soft tissue restrictions, analyze and cue movement patterns, and analyze and modify body mechanics/ergonomics to attain remaining goals. Progress toward goals / Updated goals:    Progressing towards STG 3.       PLAN  [x]  Upgrade activities as tolerated YES Continue plan of care   []  Discharge due to : []  Other:      Therapist: Sidra Bradshaw DPT    Date: 8/26/2022 Time: 1:16 PM     Future Appointments   Date Time Provider Rukhsana Gutiérrez   8/29/2022 11:15 AM Tacho He, PT [facility]   8/31/2022 12:00 PM Tacho He, PT [facility]   9/6/2022 11:00 AM Haily Ortiz PT [facility]   9/8/2022 11:00 AM Haily Ortiz, PT [facility]

## 2022-08-29 ENCOUNTER — APPOINTMENT (OUTPATIENT)
Dept: PHYSICAL THERAPY | Age: 82
End: 2022-08-29
Payer: MEDICARE

## 2022-08-29 ENCOUNTER — TELEPHONE (OUTPATIENT)
Dept: PHYSICAL THERAPY | Age: 82
End: 2022-08-29

## 2022-08-31 ENCOUNTER — HOSPITAL ENCOUNTER (OUTPATIENT)
Dept: PHYSICAL THERAPY | Age: 82
Discharge: HOME | End: 2022-08-31
Payer: MEDICARE

## 2022-08-31 PROCEDURE — 97112 NEUROMUSCULAR REEDUCATION: CPT

## 2022-08-31 PROCEDURE — 97110 THERAPEUTIC EXERCISES: CPT

## 2022-08-31 NOTE — THERAPY EVALUATION
PHYSICAL THERAPY - DAILY TREATMENT NOTE    Patient Name: Shahla Kurtz        Date: 2022  : 1940   YES Patient  Verified  Visit #:      of   12  Insurance: Payor: Alhaji Peters / Plan: [place] MEDICARE PART A & B / Product Type: Medicare /      In time:  Out time: 1250   Total Treatment Time: 45     BCBS/Medicare Time Tracking (below)   Total Timed Codes (min):  40 1:1 Treatment Time:  40     TREATMENT AREA =  Neck pain [M54.2]    SUBJECTIVE  Pain Level (on 0 to 10 scale):  8  / 10   Medication Changes/New allergies or changes in medical history, any new surgeries or procedures? NO    If yes, update Summary List   Subjective Functional Status/Changes:  []  No changes reported     Its worse. My hand is numb more often than it was and it doesn't do away as quickly if I move my head. Wearing the posture brace helps but I haven't worn it much.       Modalities Rationale:     decrease inflammation, decrease pain and increase tissue extensibility to improve patient's ability to perform ADLs   min [] Estim, type/location:                                     []  att     []  unatt     []  w/US     []  w/ice    []  w/heat    min []  Mechanical Traction: type/lbs                   []  pro   []  sup   []  int   []  cont    []  before manual    []  after manual    min []  Ultrasound, settings/location:      min []  Iontophoresis w/ dexamethasone, location:                                               []  take home patch       []  in clinic    min []  Ice     []  Heat    location/position:     min []  Vasopneumatic Device, press/temp: If using vaso (only need to measure limb vaso being performed on)      pre-treatment girth :       post-treatment girth :       measured at (landmark location) :      min []  Other:    [x] Skin assessment post-treatment (if applicable):    [x]  intact    [x]  redness- no adverse reaction     []redness - adverse reaction:        10 min Therapeutic Exercise:  [x]  See flow sheet   Rationale:      increase ROM and increase strength to improve the patients ability to perform unlimited ADLs      min Therapeutic Activity: [x]  See flow sheet   Rationale:    increase ROM, increase strength, and improve coordination to improve the patients mechanics with reaching, transfers    30 min Neuromuscular Re-ed: [x]  See flow sheet, postural review, spinal proprioception when sitting, writing, reaching   Rationale:    improve coordination, improve balance, and increase proprioception to improve the patients postural awareness, stability and motor control      Billed With/As:   [x] TE   [] TA   [x] Neuro   [] Self Care Patient Education: [x] Review HEP    [] Progressed/Changed HEP based on:   [] positioning   [] body mechanics   [] transfers   [] heat/ice application    [x] other: inc frequency/duration of wearing posture brace. Reviewed importance of taking NSAIDS as directed on bottle. 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  Demonstrates improved postural awareness in static sitting but unaware of frequent postural collapse when reaching, turning and looking down.      []  See Progress Note/Recertification   Patient will continue to benefit from skilled PT services to modify and progress therapeutic interventions, address functional mobility deficits, address ROM deficits, address strength deficits, analyze and address soft tissue restrictions, analyze and cue movement patterns, analyze and modify body mechanics/ergonomics, assess and modify postural abnormalities, address imbalance/dizziness and instruct in home and community integration to attain remaining goals.   Progress toward goals / Updated goals:    Min progress towards STG3     PLAN  [x]  Upgrade activities as tolerated YES Continue plan of care   []  Discharge due to :    []  Other:      Therapist: Paradise Petit, PT, DPT, MTC, CMTPT    Date: 8/31/2022 Time: 1:39 PM     Future Appointments   Date Time Provider Rukhsana Gutiérrez   9/6/2022 11:00 AM Halle eRd PT [facility]   9/8/2022 11:00 AM Halle Red PT [facility]

## 2022-09-06 ENCOUNTER — HOSPITAL ENCOUNTER (OUTPATIENT)
Dept: PHYSICAL THERAPY | Age: 82
Discharge: HOME | End: 2022-09-06
Payer: MEDICARE

## 2022-09-06 PROCEDURE — 97112 NEUROMUSCULAR REEDUCATION: CPT

## 2022-09-06 PROCEDURE — 97110 THERAPEUTIC EXERCISES: CPT

## 2022-09-06 NOTE — PROGRESS NOTES
[address] [place] [address] PHYSICAL THERAPY AT [address] [place] [address] [facility], [address] [place] [address], [address]  Phone: [phone]  Fax: [phone]  PROGRESS NOTE  Patient Name: Huber Barton : 1940   Treatment/Medical Diagnosis: Neck pain [M54.2]   Referral Source: Shoaib Kang MD     Date of Initial Visit: 2022 Attended Visits: 6 Missed Visits: 0     SUMMARY OF TREATMENT  Postural education, nm re-education of scapular stabilizers, stretching and AROM of C/s and T/s, HEP prescription, education for donning/doffing lumbar brace  CURRENT STATUS  Sleeping with cervical roll. Lumbar brace when working or sitting for long periods, helps with a lot of symptoms and improves postural control. Wears 50% of the time. Goal/Measure of Progress Goal Met? 1. Patient to be independent & compliant with a progressive, high level HEP in order to maintain gains made in physical therapy. Status at last Eval: - Current Status: Independent, compliant daily  yes   2. Improve FOTO score to 61 indicating significant functional improvement in order to return to PLOF. Status at last Eval: 54 Current Status: 43 Progressing   3. Patient to report 50% dec in L hand radicular SX to allow for inc ease performing ADLs and household chores. Status at last Eval: Constant  Current Status: Thumb numbness constant, tingling into first three fingers when slouching yes   4. Restore C/S AROM to [facility] without reproduction of L UE radicular SX to allow for inc ease with L UE OH reaching and prolonged sitting. Status at last Eval: flex 50%, ext 25% with ERP, SBL/RotL 25% with inc L hand radicular SX, SBR 40% dec L radicular SX, RotR 40% with inc L UE SX Current Status: Patient deferred c/s AROM assessment due to fear of radicular symptoms Progressing     New Goals to be achieved in __3-4__  weeks:  1.   Patient to be independent & compliant with a progressive, high level HEP in order to maintain gains made in physical therapy. 2.  Improve FOTO score to 61 indicating significant functional improvement in order to return to PLOF. 3.  Patient to report 50% dec in L hand radicular SX to allow for inc ease performing ADLs and household chores. 4.  Restore C/S AROM to [facility] without reproduction of L UE radicular SX to allow for inc ease with L UE OH reaching and prolonged sitting. RECOMMENDATIONS  PT chart to be put on hold to assess symptoms with postural adjustments, will reach out in 2 weeks to assess symptoms. Thank you! If you have any questions/comments please contact us directly at [phone]. Thank you for allowing us to assist in the care of your patient. Therapist Signature: Dima Yoon DPT Date: 9/6/2022   Reporting period:  Time: 11:11 AM   NOTE TO PHYSICIAN:  PLEASE COMPLETE THE ORDERS BELOW AND FAX TO   [facility]: [phone]. If you are unable to process this request in 24 hours please contact our office: [phone].    ___ I have read the above report and request that my patient continue as recommended.   ___ I have read the above report and request that my patient continue therapy with the following changes/special instructions:_________________________________________________________   ___ I have read the above report and request that my patient be discharged from therapy. I certify that the above Physical Therapy Services are being furnished while the patient is under my care. I agree with the treatment plan and certify that this therapy is necessary.   Physician Signature:_______________________________________________  Date:____________________________     Time: _______________________________________  Uma Treadwell MD

## 2022-09-06 NOTE — PROGRESS NOTES
PHYSICAL THERAPY - DAILY TREATMENT NOTE    Patient Name: Bianka English        Date: 2022  : 1940   YES Patient  Verified  Visit #:     Insurance: Payor: Inna Haines / Plan: [place] MEDICARE PART A & B / Product Type: Medicare /      In time: 1100 Out time: 1140   Total Treatment Time: 40     BCBS/Medicare Time Tracking (below)   Total Timed Codes (min):  40 1:1 Treatment Time:  40     TREATMENT AREA =  Neck pain [M54.2]    SUBJECTIVE  Pain Level (on 0 to 10 scale):  0  / 10   Medication Changes/New allergies or changes in medical history, any new surgeries or procedures? NO    If yes, update Summary List   Subjective Functional Status/Changes:  []  No changes reported     I have had very minimal pain over the weekend because I am paying attention to my posture. Modalities Rationale:     decrease edema, decrease inflammation, and decrease pain to improve patient's ability to perform pain-free ADLs.     min [] Estim, type/location:                                      []  att     []  unatt     []  w/US     []  w/ice    []  w/heat    min []  Mechanical Traction: type/lbs                   []  pro   []  sup   []  int   []  cont    []  before manual    []  after manual    min []  Ultrasound, settings/location:      min []  Iontophoresis w/ dexamethasone, location:                                               []  take home patch       []  in clinic   PD min []  Ice     []  Heat    location/position:     min []  Vasopneumatic Device, press/temp:    If using vaso (only need to measure limb vaso being performed on)      pre-treatment girth :       post-treatment girth :       measured at (landmark location) :      min []  Other:    [] Skin assessment post-treatment (if applicable):    []  intact    []  redness- no adverse reaction                  []redness - adverse reaction:        10 min Therapeutic Exercise:  [x]  See flow sheet   Rationale:      increase ROM, increase strength, and improve coordination to improve the patients ability to perform unlimited ADLs. 30 min Neuromuscular Re-ed: [x]  See flow sheet   Rationale:    increase ROM, increase strength, improve coordination, and increase proprioception to improve the patients ability to improve postural control    Billed With/As:   [x] TE   [] TA   [] Neuro   [] Self Care Patient Education: [x] Review HEP    [] Progressed/Changed HEP based on:   [] positioning   [] body mechanics   [] transfers   [] heat/ice application    [] other:      Other Objective/Functional Measures:    See PN     Post Treatment Pain Level (on 0 to 10) scale:   0  / 10     ASSESSMENT  Assessment/Changes in Function:     See PN     []  See Progress Note/Recertification   Patient will continue to benefit from skilled PT services to modify and progress therapeutic interventions, address functional mobility deficits, address ROM deficits, address strength deficits, analyze and address soft tissue restrictions, analyze and cue movement patterns, analyze and modify body mechanics/ergonomics, and assess and modify postural abnormalities to attain remaining goals. Progress toward goals / Updated goals:    See PN to MD     PLAN  [x]  Upgrade activities as tolerated YES Continue plan of care   []  Discharge due to :    []  Other:      Therapist: Leanne Chu DPT    Date: 9/6/2022 Time: 12:31 PM     No future appointments.

## 2022-09-08 ENCOUNTER — APPOINTMENT (OUTPATIENT)
Dept: PHYSICAL THERAPY | Age: 82
End: 2022-09-08
Payer: MEDICARE

## 2022-10-10 NOTE — PROGRESS NOTES
[address] [place] [address] PHYSICAL THERAPY AT [address] [place] [address] [facility], [address] [place] [address], [address]  Phone: [phone]  Fax: [phone] FOR PHYSICAL THERAPY          Patient Name: Ulysses Chi : 1940   Treatment/Medical Diagnosis: Neck pain [M54.2]   Onset Date: May 2022    Referral Source: Cruzito Carrasco MD [facility] [place]): 2022   Prior Hospitalization: See Medical History Provider #: [id number]   Prior Level of Function: Unlimited sitting tolerance, able to perform ADLs without UE radiculopathy   Comorbidities: Arthritis, HX lumbar stenosis   Medications: Verified on Patient Summary List   Visits from [facility]: 6 Missed Visits: 0       Key Functional Changes/Progress: Ms. Cleo Smith was seen for 6 follow up visits and was demonstrating improvement with postural adjustments and symptom management. Pt was last seen on 2022 and was stating she had very minimal pain with modifications and postural adjustments while maintaining prolonged positions. She wears lumbar brace when sitting for long periods and reports 50% improvement in symptoms since beginning PT. Pt requested she be placed on hold to assess symptoms with these continued adjustments; she has not reached out since last visit on 2022 so current status is unknown. Assessments/Recommendations: Discontinue therapy due to lack of attendance or compliance. If you have any questions/comments please contact us directly at [phone]. Thank you for allowing us to assist in the care of your patient. Therapist Signature: Chetna Barrett DPT Date: 10/10/2022   Reporting Period: 2022-10/10/2022 Time: 0:17 PM     I certify that the above Physical Therapy Services are being furnished while the patient is under my care. I agree with the treatment plan and certify that this therapy is necessary.     Physician Signature:                                                             Date: Time:                                                                       Sohan Mccoy MD